# Patient Record
Sex: MALE | Race: WHITE | HISPANIC OR LATINO
[De-identification: names, ages, dates, MRNs, and addresses within clinical notes are randomized per-mention and may not be internally consistent; named-entity substitution may affect disease eponyms.]

---

## 2023-03-16 PROBLEM — Z00.00 ENCOUNTER FOR PREVENTIVE HEALTH EXAMINATION: Status: ACTIVE | Noted: 2023-03-16

## 2023-03-17 ENCOUNTER — APPOINTMENT (OUTPATIENT)
Dept: PEDIATRIC ORTHOPEDIC SURGERY | Facility: CLINIC | Age: 53
End: 2023-03-17
Payer: COMMERCIAL

## 2023-03-17 VITALS
BODY MASS INDEX: 31.85 KG/M2 | HEIGHT: 65 IN | DIASTOLIC BLOOD PRESSURE: 87 MMHG | TEMPERATURE: 96.8 F | SYSTOLIC BLOOD PRESSURE: 147 MMHG | WEIGHT: 191.13 LBS

## 2023-03-17 DIAGNOSIS — M77.11 LATERAL EPICONDYLITIS, RIGHT ELBOW: ICD-10-CM

## 2023-03-17 PROCEDURE — 73080 X-RAY EXAM OF ELBOW: CPT

## 2023-03-17 PROCEDURE — 20551 NJX 1 TENDON ORIGIN/INSJ: CPT

## 2023-03-17 PROCEDURE — 99203 OFFICE O/P NEW LOW 30 MIN: CPT | Mod: 25

## 2023-03-17 NOTE — PROCEDURE
[FreeTextEntry1] : Under sterile technique with a Betadine prep the lateral epicondyle and extensor wad of the right elbow was injected using a 22-gauge needle with 40 mg of methylprednisolone and 3 cc of 1% lidocaine with a Band-Aid dressing applied at the conclusion this was tolerated without incident

## 2023-03-17 NOTE — PHYSICAL EXAM
[de-identified] : Examination today reveals symmetric appearing upper extremities.  He has full motion to the shoulder as well as to the right elbow although full extension causes him discomfort.  He has no evidence of instability on stress of the collateral ligaments.  He does however have significant pain over the lateral epicondyle and extensor wad.  Neurovascular status is intact.\par \par X-rays ordered and taken today of the right elbow reveal minimal degenerative change no loose body/lesion.

## 2023-03-17 NOTE — CONSULT LETTER
[Dear  ___] : Dear  [unfilled], [Consult Letter:] : I had the pleasure of evaluating your patient, [unfilled]. [Please see my note below.] : Please see my note below. [Consult Closing:] : Thank you very much for allowing me to participate in the care of this patient.  If you have any questions, please do not hesitate to contact me. [Sincerely,] : Sincerely, [FreeTextEntry3] : Dr Serafin Huang JR.\par

## 2023-03-17 NOTE — ASSESSMENT
[FreeTextEntry1] : Impression: Lateral epicondylitis right elbow.\par \par The elbow has been injected he will continue with Motrin.  I have prescribed formal physical therapy for this patient he will return at the conclusion of therapy if he is not improving

## 2023-03-17 NOTE — HISTORY OF PRESENT ILLNESS
[de-identified] : This 52-year-old is seen today for evaluation of his right elbow.  He has had a 2-month history of pain along the lateral and posterior aspect of the elbow with no obvious history of trauma precipitating event.  He has noted pain that radiates distally to the wrist dorsally.  No numbness or paresthesias.  No swelling.  He has not had any clicking or popping.  No neck pain or shoulder discomfort.  He was seen by his internist who placed him on Motrin 800 mg as well as gabapentin he has not had any significant improvement as yet.  His past medical history is unremarkable.